# Patient Record
Sex: FEMALE | Race: BLACK OR AFRICAN AMERICAN
[De-identification: names, ages, dates, MRNs, and addresses within clinical notes are randomized per-mention and may not be internally consistent; named-entity substitution may affect disease eponyms.]

---

## 2017-05-07 ENCOUNTER — HOSPITAL ENCOUNTER (EMERGENCY)
Dept: HOSPITAL 35 - ER | Age: 32
Discharge: HOME | End: 2017-05-07
Payer: COMMERCIAL

## 2017-05-07 VITALS — DIASTOLIC BLOOD PRESSURE: 90 MMHG | SYSTOLIC BLOOD PRESSURE: 135 MMHG

## 2017-05-07 VITALS — HEIGHT: 66 IN | WEIGHT: 293 LBS | BODY MASS INDEX: 47.09 KG/M2

## 2017-05-07 DIAGNOSIS — N76.0: Primary | ICD-10-CM

## 2017-05-07 DIAGNOSIS — F32.9: ICD-10-CM

## 2017-05-07 DIAGNOSIS — F41.9: ICD-10-CM

## 2017-05-07 LAB
BILIRUB UR-MCNC: NEGATIVE MG/DL
COLOR UR: YELLOW
KETONES UR STRIP-MCNC: NEGATIVE MG/DL
NITRITE UR QL STRIP: NEGATIVE
RBC # UR STRIP: NEGATIVE /UL
SP GR UR STRIP: 1.02 (ref 1–1.03)
URINE GLUCOSE-RANDOM*: NEGATIVE
URINE PROTEIN (DIPSTICK): NEGATIVE
UROBILINOGEN UR STRIP-ACNC: 0.2 E.U./DL (ref 0.2–1)

## 2018-02-19 ENCOUNTER — HOSPITAL ENCOUNTER (EMERGENCY)
Dept: HOSPITAL 35 - ER | Age: 33
Discharge: HOME | End: 2018-02-19
Payer: COMMERCIAL

## 2018-02-19 VITALS — WEIGHT: 293 LBS | HEIGHT: 66 IN | BODY MASS INDEX: 47.09 KG/M2

## 2018-02-19 DIAGNOSIS — N89.8: Primary | ICD-10-CM

## 2018-02-19 DIAGNOSIS — Z91.040: ICD-10-CM

## 2018-02-19 LAB
BILIRUB UR-MCNC: NEGATIVE MG/DL
COLOR UR: YELLOW
KETONES UR STRIP-MCNC: (no result) MG/DL
RBC # UR STRIP: NEGATIVE /UL
SP GR UR STRIP: 1.02 (ref 1–1.03)
URINE CLARITY: CLEAR
URINE GLUCOSE-RANDOM*: NEGATIVE
URINE LEUKOCYTES-REFLEX: NEGATIVE
URINE NITRITE-REFLEX: NEGATIVE
URINE PROTEIN (DIPSTICK): NEGATIVE
UROBILINOGEN UR STRIP-ACNC: 0.2 E.U./DL (ref 0.2–1)

## 2018-05-18 ENCOUNTER — HOSPITAL ENCOUNTER (EMERGENCY)
Dept: HOSPITAL 35 - ER | Age: 33
Discharge: HOME | End: 2018-05-18
Payer: COMMERCIAL

## 2018-05-18 VITALS — DIASTOLIC BLOOD PRESSURE: 81 MMHG | SYSTOLIC BLOOD PRESSURE: 119 MMHG

## 2018-05-18 VITALS — WEIGHT: 293 LBS | HEIGHT: 66 IN | BODY MASS INDEX: 47.09 KG/M2

## 2018-05-18 DIAGNOSIS — F32.9: ICD-10-CM

## 2018-05-18 DIAGNOSIS — M72.2: Primary | ICD-10-CM

## 2018-05-18 DIAGNOSIS — F41.9: ICD-10-CM

## 2018-06-19 ENCOUNTER — HOSPITAL ENCOUNTER (EMERGENCY)
Dept: HOSPITAL 35 - ER | Age: 33
Discharge: HOME | End: 2018-06-19
Payer: COMMERCIAL

## 2018-06-19 VITALS — BODY MASS INDEX: 47.09 KG/M2 | HEIGHT: 66 IN | WEIGHT: 293 LBS

## 2018-06-19 VITALS — DIASTOLIC BLOOD PRESSURE: 87 MMHG | SYSTOLIC BLOOD PRESSURE: 145 MMHG

## 2018-06-19 DIAGNOSIS — F32.9: ICD-10-CM

## 2018-06-19 DIAGNOSIS — F41.9: ICD-10-CM

## 2018-06-19 DIAGNOSIS — M72.2: Primary | ICD-10-CM

## 2018-06-19 DIAGNOSIS — Z91.040: ICD-10-CM

## 2019-03-26 ENCOUNTER — HOSPITAL ENCOUNTER (EMERGENCY)
Dept: HOSPITAL 35 - ER | Age: 34
Discharge: HOME | End: 2019-03-26
Payer: COMMERCIAL

## 2019-03-26 VITALS — WEIGHT: 293 LBS | HEIGHT: 69 IN | BODY MASS INDEX: 43.4 KG/M2

## 2019-03-26 VITALS — SYSTOLIC BLOOD PRESSURE: 119 MMHG | DIASTOLIC BLOOD PRESSURE: 66 MMHG

## 2019-03-26 DIAGNOSIS — Z91.040: ICD-10-CM

## 2019-03-26 DIAGNOSIS — J10.1: Primary | ICD-10-CM

## 2019-03-29 ENCOUNTER — HOSPITAL ENCOUNTER (EMERGENCY)
Dept: HOSPITAL 35 - ER | Age: 34
Discharge: HOME | End: 2019-03-29
Payer: COMMERCIAL

## 2019-03-29 VITALS — WEIGHT: 293 LBS | HEIGHT: 66 IN | BODY MASS INDEX: 47.09 KG/M2

## 2019-03-29 VITALS — SYSTOLIC BLOOD PRESSURE: 142 MMHG | DIASTOLIC BLOOD PRESSURE: 93 MMHG

## 2019-03-29 DIAGNOSIS — J10.1: Primary | ICD-10-CM

## 2019-03-29 DIAGNOSIS — Z91.040: ICD-10-CM

## 2019-10-11 ENCOUNTER — HOSPITAL ENCOUNTER (EMERGENCY)
Dept: HOSPITAL 35 - ER | Age: 34
Discharge: HOME | End: 2019-10-11
Payer: COMMERCIAL

## 2019-10-11 VITALS — HEIGHT: 66 IN | WEIGHT: 214.99 LBS | BODY MASS INDEX: 34.55 KG/M2

## 2019-10-11 VITALS — SYSTOLIC BLOOD PRESSURE: 169 MMHG | DIASTOLIC BLOOD PRESSURE: 105 MMHG

## 2019-10-11 DIAGNOSIS — F32.9: ICD-10-CM
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
## 2019-10-11 DIAGNOSIS — Z91.040: ICD-10-CM
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
## 2019-10-11 DIAGNOSIS — T20.05XA: Primary | ICD-10-CM

## 2019-10-11 DIAGNOSIS — F41.9: ICD-10-CM
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
## 2020-06-19 ENCOUNTER — HOSPITAL ENCOUNTER (EMERGENCY)
Dept: HOSPITAL 35 - ER | Age: 35
Discharge: HOME | End: 2020-06-19
Payer: COMMERCIAL

## 2020-06-19 VITALS — BODY MASS INDEX: 47.09 KG/M2 | HEIGHT: 66 IN | WEIGHT: 293 LBS

## 2020-06-19 VITALS — DIASTOLIC BLOOD PRESSURE: 76 MMHG | SYSTOLIC BLOOD PRESSURE: 148 MMHG

## 2020-06-19 DIAGNOSIS — Z91.040: ICD-10-CM

## 2020-06-19 DIAGNOSIS — F41.9: ICD-10-CM

## 2020-06-19 DIAGNOSIS — R51: ICD-10-CM

## 2020-06-19 DIAGNOSIS — R53.1: ICD-10-CM

## 2020-06-19 DIAGNOSIS — F32.9: ICD-10-CM

## 2020-06-19 DIAGNOSIS — R42: Primary | ICD-10-CM

## 2020-06-19 DIAGNOSIS — H53.8: ICD-10-CM

## 2020-06-19 LAB
ALBUMIN SERPL-MCNC: 3 G/DL (ref 3.4–5)
ALT SERPL-CCNC: 25 U/L (ref 30–65)
ANION GAP SERPL CALC-SCNC: 3 MMOL/L (ref 7–16)
AST SERPL-CCNC: 18 U/L (ref 15–37)
BASOPHILS NFR BLD AUTO: 0.7 % (ref 0–2)
BILIRUB SERPL-MCNC: 0.3 MG/DL (ref 0.2–1)
BILIRUB UR-MCNC: NEGATIVE MG/DL
BUN SERPL-MCNC: 9 MG/DL (ref 7–18)
CALCIUM SERPL-MCNC: 8.6 MG/DL (ref 8.5–10.1)
CHLORIDE SERPL-SCNC: 103 MMOL/L (ref 98–107)
CO2 SERPL-SCNC: 32 MMOL/L (ref 21–32)
COLOR UR: YELLOW
CREAT SERPL-MCNC: 1 MG/DL (ref 0.6–1)
EOSINOPHIL NFR BLD: 1.9 % (ref 0–3)
ERYTHROCYTE [DISTWIDTH] IN BLOOD BY AUTOMATED COUNT: 15.9 % (ref 10.5–14.5)
GLUCOSE SERPL-MCNC: 91 MG/DL (ref 74–106)
GRANULOCYTES NFR BLD MANUAL: 59.8 % (ref 36–66)
HCT VFR BLD CALC: 38.7 % (ref 37–47)
HGB BLD-MCNC: 12.5 GM/DL (ref 12–15)
KETONES UR STRIP-MCNC: NEGATIVE MG/DL
LYMPHOCYTES NFR BLD AUTO: 29.9 % (ref 24–44)
MAGNESIUM SERPL-MCNC: 2 MG/DL (ref 1.8–2.4)
MCH RBC QN AUTO: 24.3 PG (ref 26–34)
MCHC RBC AUTO-ENTMCNC: 32.3 G/DL (ref 28–37)
MCV RBC: 75.1 FL (ref 80–100)
MONOCYTES NFR BLD: 7.7 % (ref 1–8)
NEUTROPHILS # BLD: 4 THOU/UL (ref 1.4–8.2)
PLATELET # BLD: 292 THOU/UL (ref 150–400)
POTASSIUM SERPL-SCNC: 4.2 MMOL/L (ref 3.5–5.1)
PROT SERPL-MCNC: 6.3 G/DL (ref 6.4–8.2)
RBC # BLD AUTO: 5.15 MIL/UL (ref 4.2–5)
RBC # UR STRIP: NEGATIVE /UL
SALICYLATES SERPL-MCNC: < 2.8 MG/DL (ref 2.8–20)
SODIUM SERPL-SCNC: 138 MMOL/L (ref 136–145)
SP GR UR STRIP: 1.01 (ref 1–1.03)
TROPONIN I SERPL-MCNC: <0.06 NG/ML (ref ?–0.06)
URINE CLARITY: CLEAR
URINE GLUCOSE-RANDOM*: NEGATIVE
URINE LEUKOCYTES-REFLEX: NEGATIVE
URINE NITRITE-REFLEX: NEGATIVE
URINE PROTEIN (DIPSTICK): NEGATIVE
UROBILINOGEN UR STRIP-ACNC: 0.2 E.U./DL (ref 0.2–1)
WBC # BLD AUTO: 6.7 THOU/UL (ref 4–11)

## 2020-06-20 NOTE — EKG
[facility]
[address]ilda [place]   [address]                     ELECTROCARDIOGRAM REPORT      
_______________________________________________________________________________
 
Name:       TOMASZ CARLISLE BRANDON              Room #:                     [facility]#:      [id number]                       Account #:      [id number]  
Admission:  20    Attend Phys:                          
Discharge:  20    Date of Birth:  [birth year]  
                                                          Report #: [id number]
                                                                    [id number]
_______________________________________________________________________________
THIS REPORT FOR:  
 
cc:  JEFFREY - Farzana family physician/PCP 
     JEFFREY - No family physician/PCP 
     Servando Lima MD                                            ~
THIS REPORT FOR:   //name//                          
 
                         [facility] ED
                                       
Test Date:    2020               Test Time:    18:01:27
Pat Name:     TOMASZ CARLISLE           Department:   
Patient ID:   [id number]            Room:          
Gender:       F                        Technician:   [facility]
:          1985               Requested By: Dre Altman
Order Number: [id number] MD:   Servando Lima
                                 Measurements
Intervals                              Axis          
Rate:         58                       P:            73
MA:           140                      QRS:          61
QRSD:         85                       T:            37
QT:           399                                    
QTc:          392                                    
                           Interpretive Statements
Sinus arrhythmia
No previous ECG available for comparison
 
Electronically Signed On 2020 11:50:43 CDT by Servando Lima
[email or web address]
 
 
 
 
 
 
 
 
 
 
 
 
 
 
 
 
  <ELECTRONICALLY SIGNED>
   By: Servando Lima MD        
  20     1150
D: 20                           _____________________________________
T: 20                           Servando Lima MD          /TIMI

## 2020-11-10 ENCOUNTER — HOSPITAL ENCOUNTER (EMERGENCY)
Dept: HOSPITAL 35 - ER | Age: 35
Discharge: HOME | End: 2020-11-10
Payer: COMMERCIAL

## 2020-11-10 VITALS — DIASTOLIC BLOOD PRESSURE: 96 MMHG | SYSTOLIC BLOOD PRESSURE: 143 MMHG

## 2020-11-10 VITALS — HEIGHT: 66 IN | WEIGHT: 293 LBS | BODY MASS INDEX: 47.09 KG/M2

## 2020-11-10 DIAGNOSIS — F41.9: ICD-10-CM

## 2020-11-10 DIAGNOSIS — Z88.8: ICD-10-CM

## 2020-11-10 DIAGNOSIS — Z79.899: ICD-10-CM

## 2020-11-10 DIAGNOSIS — N72: Primary | ICD-10-CM

## 2020-11-10 DIAGNOSIS — Z91.040: ICD-10-CM

## 2020-11-10 DIAGNOSIS — F32.9: ICD-10-CM

## 2020-11-10 LAB
BILIRUB UR-MCNC: NEGATIVE MG/DL
COLOR UR: YELLOW
KETONES UR STRIP-MCNC: NEGATIVE MG/DL
RBC # UR STRIP: NEGATIVE /UL
SP GR UR STRIP: 1.02 (ref 1–1.03)
URINE CLARITY: CLEAR
URINE GLUCOSE-RANDOM*: NEGATIVE
URINE LEUKOCYTES-REFLEX: NEGATIVE
URINE NITRITE-REFLEX: NEGATIVE
URINE PROTEIN (DIPSTICK): NEGATIVE
UROBILINOGEN UR STRIP-ACNC: 0.2 E.U./DL (ref 0.2–1)

## 2021-08-15 ENCOUNTER — HOSPITAL ENCOUNTER (EMERGENCY)
Dept: HOSPITAL 35 - ER | Age: 36
Discharge: HOME | End: 2021-08-15
Payer: COMMERCIAL

## 2021-08-15 VITALS — WEIGHT: 293 LBS | HEIGHT: 66 IN | BODY MASS INDEX: 47.09 KG/M2

## 2021-08-15 VITALS — DIASTOLIC BLOOD PRESSURE: 101 MMHG | SYSTOLIC BLOOD PRESSURE: 155 MMHG

## 2021-08-15 DIAGNOSIS — F32.9: ICD-10-CM

## 2021-08-15 DIAGNOSIS — Z91.040: ICD-10-CM

## 2021-08-15 DIAGNOSIS — Z79.899: ICD-10-CM

## 2021-08-15 DIAGNOSIS — Z88.8: ICD-10-CM

## 2021-08-15 DIAGNOSIS — Z20.822: ICD-10-CM

## 2021-08-15 DIAGNOSIS — Z98.890: ICD-10-CM

## 2021-08-15 DIAGNOSIS — Z88.6: ICD-10-CM

## 2021-08-15 DIAGNOSIS — J18.9: Primary | ICD-10-CM

## 2021-08-15 DIAGNOSIS — F41.9: ICD-10-CM

## 2021-08-15 DIAGNOSIS — Z88.5: ICD-10-CM

## 2021-08-15 DIAGNOSIS — R07.89: ICD-10-CM
